# Patient Record
Sex: FEMALE | Race: WHITE | NOT HISPANIC OR LATINO | URBAN - METROPOLITAN AREA
[De-identification: names, ages, dates, MRNs, and addresses within clinical notes are randomized per-mention and may not be internally consistent; named-entity substitution may affect disease eponyms.]

---

## 2021-05-19 ENCOUNTER — APPOINTMENT (OUTPATIENT)
Dept: URBAN - METROPOLITAN AREA SURGERY 18 | Age: 41
Setting detail: DERMATOLOGY
End: 2021-05-20

## 2021-05-19 VITALS — TEMPERATURE: 97.7 F | SYSTOLIC BLOOD PRESSURE: 115 MMHG | HEART RATE: 82 BPM | DIASTOLIC BLOOD PRESSURE: 74 MMHG

## 2021-05-19 PROBLEM — C44.319 BASAL CELL CARCINOMA OF SKIN OF OTHER PARTS OF FACE: Status: ACTIVE | Noted: 2021-05-19

## 2021-05-19 PROCEDURE — 14041 TIS TRNFR F/C/C/M/N/A/G/H/F: CPT

## 2021-05-19 PROCEDURE — OTHER REFERRAL CORRESPONDENCE: OTHER

## 2021-05-19 PROCEDURE — 17312 MOHS ADDL STAGE: CPT

## 2021-05-19 PROCEDURE — OTHER MOHS SURGERY: OTHER

## 2021-05-19 PROCEDURE — 17311 MOHS 1 STAGE H/N/HF/G: CPT

## 2021-05-19 PROCEDURE — OTHER ADDITIONAL NOTES: OTHER

## 2021-05-19 NOTE — PROCEDURE: ADDITIONAL NOTES
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
Render Risk Assessment In Note?: no
Detail Level: Simple

## 2021-05-19 NOTE — PROCEDURE: MOHS SURGERY
No concerns.  Ultrasound next visit.   Referred To Oculoplastics For Closure Text (Leave Blank If You Do Not Want): After obtaining clear surgical margins the patient was sent to oculoplastics for surgical repair.  The patient understands they will receive post-surgical care and follow-up from the oculoplastic surgeon's and referring physician's office, and may follow up in our surgical clinic as needed.

## 2021-06-16 ENCOUNTER — APPOINTMENT (OUTPATIENT)
Dept: URBAN - METROPOLITAN AREA SURGERY 18 | Age: 41
Setting detail: DERMATOLOGY
End: 2021-06-17

## 2021-06-16 VITALS — TEMPERATURE: 97.8 F

## 2021-06-16 DIAGNOSIS — Z48.817 ENCOUNTER FOR SURGICAL AFTERCARE FOLLOWING SURGERY ON THE SKIN AND SUBCUTANEOUS TISSUE: ICD-10-CM

## 2021-06-16 PROCEDURE — OTHER POST-OP WOUND CHECK: OTHER

## 2021-06-16 PROCEDURE — 99024 POSTOP FOLLOW-UP VISIT: CPT

## 2021-06-16 ASSESSMENT — LOCATION SIMPLE DESCRIPTION DERM: LOCATION SIMPLE: RIGHT EYEBROW

## 2021-06-16 ASSESSMENT — LOCATION DETAILED DESCRIPTION DERM: LOCATION DETAILED: RIGHT CENTRAL EYEBROW

## 2021-06-16 ASSESSMENT — LOCATION ZONE DERM: LOCATION ZONE: FACE

## 2021-06-16 NOTE — PROCEDURE: POST-OP WOUND CHECK
Additional Comments: Appropriately healing surgical site; well approximated with minimally raised suture line. Importance of sun protection to prevent hyperpigmentation discussed. Spitting suture trimmed. Expected appearance as healing continues and scar line matures reviewed. Patient is pleased with postoperative outcome. Discussed follow up in 8-12 weeks, patient prefers to follow up as needed (any concerns, new onset symptoms or changes at surgical site)
Add 34436 Cpt? (Important Note: In 2017 The Use Of 46625 Is Being Tracked By Cms To Determine Future Global Period Reimbursement For Global Periods): yes
Wound Evaluated By: Dr. Julio César Tello
Detail Level: Detailed
